# Patient Record
Sex: MALE | URBAN - METROPOLITAN AREA
[De-identification: names, ages, dates, MRNs, and addresses within clinical notes are randomized per-mention and may not be internally consistent; named-entity substitution may affect disease eponyms.]

---

## 2021-10-01 ENCOUNTER — OFFICE (OUTPATIENT)
Dept: URBAN - METROPOLITAN AREA CLINIC 79 | Facility: CLINIC | Age: 71
End: 2021-10-01
Payer: OTHER GOVERNMENT

## 2021-10-01 ENCOUNTER — OFFICE (OUTPATIENT)
Dept: URBAN - METROPOLITAN AREA CLINIC 79 | Facility: CLINIC | Age: 71
End: 2021-10-01

## 2021-10-01 VITALS
SYSTOLIC BLOOD PRESSURE: 112 MMHG | HEIGHT: 66 IN | DIASTOLIC BLOOD PRESSURE: 67 MMHG | TEMPERATURE: 97.5 F | HEART RATE: 88 BPM | WEIGHT: 162 LBS

## 2021-10-01 DIAGNOSIS — R55 SYNCOPE AND COLLAPSE: ICD-10-CM

## 2021-10-01 DIAGNOSIS — K92.1 MELENA: ICD-10-CM

## 2021-10-01 PROCEDURE — 00031: CPT | Performed by: INTERNAL MEDICINE

## 2021-10-01 PROCEDURE — 99204 OFFICE O/P NEW MOD 45 MIN: CPT | Performed by: PHYSICIAN ASSISTANT

## 2021-10-01 NOTE — SERVICEHPINOTES
NELIA DENIS   is a   71   year old male who is being seen in consultation at the request of   MATIAS MENDENHALL   for blood in stools. Patient had a couple of episode of blood in stools recently. Denies any associated pain, though notes that he's been uncomfortable for awhile now due to bladder cancer (had surgery earlier this year and is having BCG placed in bladder weekly right now). Also notes prostate biopsies a couple of months ago - reports benign findings. Bleeding happened at least a month after this. He has a daily BM - sometimes uses stool softeners.milan yates His last colonoscopy was over 10 years ago at [facility]. Has remote h/o polyp many years ago. 
milan yates Denies h/o heart disease but has seen cardiology and had negative stress test - was done prior to his bladder surgeries. He denies chest pain or SOB. He did have a couple of fainting episodes 1-2 months ago and so he says his cardiologist is planning to possibly due some heart monitoring in the future. He did have an ER visit for the fainting and was diagnosed with vasovagal syncope.

## 2021-12-20 ENCOUNTER — AMBULATORY SURGICAL CENTER (OUTPATIENT)
Dept: URBAN - METROPOLITAN AREA SURGERY 23 | Facility: SURGERY | Age: 71
End: 2021-12-20
Payer: OTHER GOVERNMENT

## 2021-12-20 DIAGNOSIS — K63.5 POLYP OF COLON: ICD-10-CM

## 2021-12-20 DIAGNOSIS — D12.2 BENIGN NEOPLASM OF ASCENDING COLON: ICD-10-CM

## 2021-12-20 DIAGNOSIS — K62.5 HEMORRHAGE OF ANUS AND RECTUM: ICD-10-CM

## 2021-12-20 PROCEDURE — 45380 COLONOSCOPY AND BIOPSY: CPT | Performed by: INTERNAL MEDICINE

## 2023-06-12 ENCOUNTER — OFFICE (OUTPATIENT)
Dept: URBAN - METROPOLITAN AREA CLINIC 79 | Facility: CLINIC | Age: 73
End: 2023-06-12
Payer: OTHER GOVERNMENT

## 2023-06-12 VITALS
HEIGHT: 66 IN | TEMPERATURE: 97.7 F | HEART RATE: 80 BPM | WEIGHT: 162 LBS | SYSTOLIC BLOOD PRESSURE: 151 MMHG | DIASTOLIC BLOOD PRESSURE: 88 MMHG

## 2023-06-12 DIAGNOSIS — R14.2 ERUCTATION: ICD-10-CM

## 2023-06-12 DIAGNOSIS — K21.9 GASTRO-ESOPHAGEAL REFLUX DISEASE WITHOUT ESOPHAGITIS: ICD-10-CM

## 2023-06-12 DIAGNOSIS — R10.9 UNSPECIFIED ABDOMINAL PAIN: ICD-10-CM

## 2023-06-12 DIAGNOSIS — K44.9 DIAPHRAGMATIC HERNIA WITHOUT OBSTRUCTION OR GANGRENE: ICD-10-CM

## 2023-06-12 DIAGNOSIS — R10.11 RIGHT UPPER QUADRANT PAIN: ICD-10-CM

## 2023-06-12 PROCEDURE — 99214 OFFICE O/P EST MOD 30 MIN: CPT | Performed by: INTERNAL MEDICINE

## 2023-06-12 RX ORDER — HYOSCYAMINE SULFATE 0.12 MG/1
TABLET ORAL; SUBLINGUAL
Qty: 30 | Refills: 3 | Status: ACTIVE
Start: 2023-06-12

## 2023-07-31 ENCOUNTER — AMBULATORY SURGICAL CENTER (OUTPATIENT)
Dept: URBAN - METROPOLITAN AREA SURGERY 23 | Facility: SURGERY | Age: 73
End: 2023-07-31
Payer: OTHER GOVERNMENT

## 2023-07-31 DIAGNOSIS — K22.70 BARRETT'S ESOPHAGUS WITHOUT DYSPLASIA: ICD-10-CM

## 2023-07-31 DIAGNOSIS — K31.9 DISEASE OF STOMACH AND DUODENUM, UNSPECIFIED: ICD-10-CM

## 2023-07-31 DIAGNOSIS — R10.13 EPIGASTRIC PAIN: ICD-10-CM

## 2023-07-31 DIAGNOSIS — K21.00 GASTRO-ESOPHAGEAL REFLUX DISEASE WITH ESOPHAGITIS, WITHOUT B: ICD-10-CM

## 2023-07-31 DIAGNOSIS — K29.70 GASTRITIS, UNSPECIFIED, WITHOUT BLEEDING: ICD-10-CM

## 2023-07-31 DIAGNOSIS — R14.2 ERUCTATION: ICD-10-CM

## 2023-07-31 PROCEDURE — 43239 EGD BIOPSY SINGLE/MULTIPLE: CPT | Performed by: INTERNAL MEDICINE

## 2025-01-08 ENCOUNTER — OFFICE (OUTPATIENT)
Dept: URBAN - METROPOLITAN AREA CLINIC 79 | Facility: CLINIC | Age: 75
End: 2025-01-08
Payer: MEDICARE

## 2025-01-08 VITALS
TEMPERATURE: 97.6 F | HEIGHT: 66 IN | DIASTOLIC BLOOD PRESSURE: 82 MMHG | SYSTOLIC BLOOD PRESSURE: 147 MMHG | HEART RATE: 63 BPM | WEIGHT: 145 LBS

## 2025-01-08 DIAGNOSIS — K62.89 OTHER SPECIFIED DISEASES OF ANUS AND RECTUM: ICD-10-CM

## 2025-01-08 DIAGNOSIS — K22.70 BARRETT'S ESOPHAGUS WITHOUT DYSPLASIA: ICD-10-CM

## 2025-01-08 DIAGNOSIS — K62.5 HEMORRHAGE OF ANUS AND RECTUM: ICD-10-CM

## 2025-01-08 DIAGNOSIS — R93.3 ABNORMAL FINDINGS ON DIAGNOSTIC IMAGING OF OTHER PARTS OF DI: ICD-10-CM

## 2025-01-08 DIAGNOSIS — K59.09 OTHER CONSTIPATION: ICD-10-CM

## 2025-01-08 DIAGNOSIS — K21.9 GASTRO-ESOPHAGEAL REFLUX DISEASE WITHOUT ESOPHAGITIS: ICD-10-CM

## 2025-01-08 DIAGNOSIS — R14.2 ERUCTATION: ICD-10-CM

## 2025-01-08 DIAGNOSIS — R68.81 EARLY SATIETY: ICD-10-CM

## 2025-01-08 DIAGNOSIS — K44.9 DIAPHRAGMATIC HERNIA WITHOUT OBSTRUCTION OR GANGRENE: ICD-10-CM

## 2025-01-08 DIAGNOSIS — R14.0 ABDOMINAL DISTENSION (GASEOUS): ICD-10-CM

## 2025-01-08 DIAGNOSIS — R55 SYNCOPE AND COLLAPSE: ICD-10-CM

## 2025-01-08 PROCEDURE — 99215 OFFICE O/P EST HI 40 MIN: CPT

## 2025-01-08 RX ORDER — PANTOPRAZOLE SODIUM 40 MG/1
TABLET, DELAYED RELEASE ORAL
Qty: 30 | Refills: 2 | Status: ACTIVE
Start: 2025-01-08

## 2025-01-08 NOTE — SERVICEHPINOTES
NELIA DENIS   is a   74   male who complains of burping, GERD (much better than before), early satiety, bloating, constipation, gassiness which is worse at night. 
br
milanIn the last two two months he reports severe constipation and bright red blood while wiping, bloating, and went to ER of [facility] on 12/30/2024, had a CT of the abdomen and pelvis done, bloodwork and noted acute proctitis and Augmentin prescribed and per patient, he completed the ABx two days ago.
br Currently takes MiraLAX, increased water intake moves his bowel x1/day on BSS type 3-4 without straining and rectal bleed stopped. br br Hiatal hernia repaired in early 2024, he was advised by surgeon that it will take a year for the stomach to fully recover, he reports burping for the past one month and he reports early satiety, acid reflux, for the past one year He denies having nausea, vomiting, melena, unintentional weight loss. No poor appetite. br

brPatient denies abdominal pain/epigastric pain, dysphagia, acid reflux, nausea, vomiting, and melena. No loss of appetite or unintentional weight loss. Patient denies mucus in stool, diarrhea, fever, chills, night sweats.Because of syncopal episodes and Hx of HTN he sees a cardiologist once a year, Dr. Hoyt. No anticoagulants. NSAIDs use infrequently "once a month". Denies family hx of CRC. br 
milan
brEGD on 7/31/2023 and noted: duod - wnl reactive gastropathy [facility] at [facility]. no dysplasia.
brColonoscopy done on 12/20/2021 and noted - TA x 2, mucosal prolapse polyp and advised for r/c 5 years.
br
milan
I discussed this case with Dr. Sher.
